# Patient Record
Sex: FEMALE | Race: BLACK OR AFRICAN AMERICAN | Employment: FULL TIME | URBAN - METROPOLITAN AREA
[De-identification: names, ages, dates, MRNs, and addresses within clinical notes are randomized per-mention and may not be internally consistent; named-entity substitution may affect disease eponyms.]

---

## 2018-12-11 LAB
ANTIBODY SCREEN, EXTERNAL: NEGATIVE
CHLAMYDIA, EXTERNAL: NEGATIVE
HBSAG, EXTERNAL: NEGATIVE
HIV, EXTERNAL: NON REACTIVE
N. GONORRHEA, EXTERNAL: NEGATIVE
RPR, EXTERNAL: NON REACTIVE
RUBELLA, EXTERNAL: NORMAL
TYPE, ABO & RH, EXTERNAL: NORMAL

## 2019-06-03 ENCOUNTER — HOSPITAL ENCOUNTER (EMERGENCY)
Age: 29
Discharge: HOME | End: 2019-06-03
Attending: SPECIALIST | Admitting: SPECIALIST
Payer: COMMERCIAL

## 2019-06-03 PROCEDURE — 59025 FETAL NON-STRESS TEST: CPT

## 2019-06-03 PROCEDURE — 74011250636 HC RX REV CODE- 250/636: Performed by: ADVANCED PRACTICE MIDWIFE

## 2019-06-03 PROCEDURE — 99281 EMR DPT VST MAYX REQ PHY/QHP: CPT

## 2019-06-03 PROCEDURE — 96372 THER/PROPH/DIAG INJ SC/IM: CPT

## 2019-06-03 RX ORDER — BETAMETHASONE SODIUM PHOSPHATE AND BETAMETHASONE ACETATE 3; 3 MG/ML; MG/ML
12 INJECTION, SUSPENSION INTRA-ARTICULAR; INTRALESIONAL; INTRAMUSCULAR; SOFT TISSUE EVERY 24 HOURS
Status: DISCONTINUED | OUTPATIENT
Start: 2019-06-03 | End: 2019-06-03 | Stop reason: HOSPADM

## 2019-06-03 RX ADMIN — BETAMETHASONE SODIUM PHOSPHATE AND BETAMETHASONE ACETATE 12 MG: 3; 3 INJECTION, SUSPENSION INTRA-ARTICULAR; INTRALESIONAL; INTRAMUSCULAR at 16:34

## 2019-06-03 NOTE — PROGRESS NOTES
Received this 33 yo G4 from [place] [id number] office, per pt Dr. Kate Palomino wanted her to come over to L&D to get BMZ. Indiana Holloway on unit orders received.

## 2019-06-03 NOTE — DISCHARGE INSTRUCTIONS
Patient Education        Weeks 30 to 28 of Your Pregnancy: Care Instructions  Your Care Instructions    You have made it to the final months of your pregnancy. By now, your baby is really starting to look like a baby, with hair and plump skin. As you enter the final weeks of pregnancy, the reality of having a baby may start to set in. This is the time to settle on a name, get your household in order, set up a safe nursery, and find quality  if needed. Doing these things in advance will allow you to focus on caring for and enjoying your new baby. You may also want to have a tour of your hospital's labor and delivery unit to get a better idea of what to expect while you are in the hospital.  During these last months, it is very important to take good care of yourself and pay attention to what your body needs. If your doctor says it is okay for you to work, don't push yourself too hard. Use the tips provided in this care sheet to ease heartburn and care for varicose veins. If you haven't already had the Tdap shot during this pregnancy, talk to your doctor about getting it. It will help protect your  against pertussis infection. Follow-up care is a key part of your treatment and safety. Be sure to make and go to all appointments, and call your doctor if you are having problems. It's also a good idea to know your test results and keep a list of the medicines you take. How can you care for yourself at home? Pay attention to your baby's movements  · You should feel your baby move several times every day. · Your baby now turns less, and kicks and jabs more. · Your baby sleeps 20 to 45 minutes at a time and is more active at certain times of day. · If your doctor wants you to count your baby's kicks:  ? Empty your bladder, and lie on your side or relax in a comfortable chair. ? Write down your start time. ? Pay attention only to your baby's movements. Count any movement except hiccups. ?  After you have counted 10 movements, write down your stop time. ? Write down how many minutes it took for your baby to move 10 times. ? If an hour goes by and you have not recorded 10 movements, have something to eat or drink and then count for another hour. If you do not record 10 movements in either hour, call your doctor. Ease heartburn  · Eat small, frequent meals. · Do not eat chocolate, peppermint, or very spicy foods. Avoid drinks with caffeine, such as coffee, tea, and sodas. · Avoid bending over or lying down after meals. · Talk a short walk after you eat. · If heartburn is a problem at night, do not eat for 2 hours before bedtime. · Take antacids like Mylanta, Maalox, Rolaids, or Tums. Do not take antacids that have sodium bicarbonate. Care for varicose veins  · Varicose veins are blood vessels that stretch out with the extra blood during pregnancy. Your legs may ache or throb. Most varicose veins will go away after the birth. · Avoid standing for long periods of time. Sit with your legs crossed at the ankles, not the knees. · Sit with your feet propped up. · Avoid tight clothing or stockings. Wear support hose. · Exercise regularly. Try walking for at least 30 minutes a day. Where can you learn more? Go to http://aureliano-van.info/. Enter S109 in the search box to learn more about \"Weeks 30 to 32 of Your Pregnancy: Care Instructions. \"  Current as of: September 5, 2018  Content Version: 11.9  © 6934-8112 [employer]. Care instructions adapted under license by [employer] (which disclaims liability or warranty for this information). If you have questions about a medical condition or this instruction, always ask your healthcare professional. [place] [address] any warranty or liability for your use of this information.

## 2019-06-04 ENCOUNTER — HOSPITAL ENCOUNTER (OUTPATIENT)
Age: 29
Discharge: HOME | End: 2019-06-04
Attending: SPECIALIST | Admitting: SPECIALIST
Payer: COMMERCIAL

## 2019-06-04 VITALS — WEIGHT: 164 LBS | BODY MASS INDEX: 29.06 KG/M2 | HEIGHT: 63 IN

## 2019-06-04 PROBLEM — Z34.90 PREGNANCY: Status: ACTIVE | Noted: 2019-06-04

## 2019-06-04 PROCEDURE — 96372 THER/PROPH/DIAG INJ SC/IM: CPT

## 2019-06-04 PROCEDURE — 74011250636 HC RX REV CODE- 250/636

## 2019-06-04 RX ORDER — BETAMETHASONE SODIUM PHOSPHATE AND BETAMETHASONE ACETATE 3; 3 MG/ML; MG/ML
12 INJECTION, SUSPENSION INTRA-ARTICULAR; INTRALESIONAL; INTRAMUSCULAR; SOFT TISSUE ONCE
Status: COMPLETED | OUTPATIENT
Start: 2019-06-04 | End: 2019-06-04

## 2019-06-04 RX ORDER — BETAMETHASONE SODIUM PHOSPHATE AND BETAMETHASONE ACETATE 3; 3 MG/ML; MG/ML
INJECTION, SUSPENSION INTRA-ARTICULAR; INTRALESIONAL; INTRAMUSCULAR; SOFT TISSUE
Status: COMPLETED
Start: 2019-06-04 | End: 2019-06-04

## 2019-06-04 RX ADMIN — BETAMETHASONE SODIUM PHOSPHATE AND BETAMETHASONE ACETATE 12 MG: 3; 3 INJECTION, SUSPENSION INTRA-ARTICULAR; INTRALESIONAL; INTRAMUSCULAR; SOFT TISSUE at 16:35

## 2019-06-04 RX ADMIN — BETAMETHASONE SODIUM PHOSPHATE AND BETAMETHASONE ACETATE 12 MG: 3; 3 INJECTION, SUSPENSION INTRA-ARTICULAR; INTRALESIONAL; INTRAMUSCULAR at 16:35

## 2019-06-04 NOTE — DISCHARGE INSTRUCTIONS
General Discharge Instructions    Patient ID:  Virgil Booker  [id number]  34 y.o.  1990    Patient Instructions    Take Home Medications       What to do at Home    Recommended diet: Regular Diet    Recommended activity: Activity as tolerated    Follow-up Make appt with Dr. Mary Walls for Monday.

## 2019-06-04 NOTE — PROGRESS NOTES
1630: Spoke with Dr. Luna Nesbitt about POC with pt, Dr. Luna Nesbitt said that pt was there for only betamethasone shot then pt could go home. I clarified that pt was only here for 2nd betamethasone shot and pt could be discharged and Dr. Luna Nesbitt said yes. 1643: pt given d/c instructions, pt d/c'd home.

## 2019-07-18 ENCOUNTER — HOSPITAL ENCOUNTER (INPATIENT)
Age: 29
LOS: 2 days | Discharge: HOME | End: 2019-07-20
Attending: SPECIALIST | Admitting: SPECIALIST
Payer: COMMERCIAL

## 2019-07-18 LAB
BASOPHILS # BLD: 0 K/UL (ref 0–0.1)
BASOPHILS NFR BLD: 1 % (ref 0–1)
DIFFERENTIAL METHOD BLD: ABNORMAL
EOSINOPHIL # BLD: 0.2 K/UL (ref 0–0.4)
EOSINOPHIL NFR BLD: 3 % (ref 0–7)
ERYTHROCYTE [DISTWIDTH] IN BLOOD BY AUTOMATED COUNT: 14.6 % (ref 11.5–14.5)
HCT VFR BLD AUTO: 32.8 % (ref 35–47)
HGB BLD-MCNC: 10.9 G/DL (ref 11.5–16)
IMM GRANULOCYTES # BLD AUTO: 0.1 K/UL (ref 0–0.04)
IMM GRANULOCYTES NFR BLD AUTO: 1 % (ref 0–0.5)
LYMPHOCYTES # BLD: 1.2 K/UL (ref 0.8–3.5)
LYMPHOCYTES NFR BLD: 15 % (ref 12–49)
MCH RBC QN AUTO: 28.2 PG (ref 26–34)
MCHC RBC AUTO-ENTMCNC: 33.2 G/DL (ref 30–36.5)
MCV RBC AUTO: 84.8 FL (ref 80–99)
MONOCYTES # BLD: 0.6 K/UL (ref 0–1)
MONOCYTES NFR BLD: 8 % (ref 5–13)
NEUTS SEG # BLD: 5.7 K/UL (ref 1.8–8)
NEUTS SEG NFR BLD: 72 % (ref 32–75)
NRBC # BLD: 0 K/UL (ref 0–0.01)
NRBC BLD-RTO: 0 PER 100 WBC
PLATELET # BLD AUTO: 175 K/UL (ref 150–400)
PMV BLD AUTO: 10.8 FL (ref 8.9–12.9)
RBC # BLD AUTO: 3.87 M/UL (ref 3.8–5.2)
WBC # BLD AUTO: 7.7 K/UL (ref 3.6–11)

## 2019-07-18 PROCEDURE — 74011000258 HC RX REV CODE- 258: Performed by: SPECIALIST

## 2019-07-18 PROCEDURE — 99283 EMERGENCY DEPT VISIT LOW MDM: CPT

## 2019-07-18 PROCEDURE — 74011250637 HC RX REV CODE- 250/637: Performed by: SPECIALIST

## 2019-07-18 PROCEDURE — 75410000003 HC RECOV DEL/VAG/CSECN EA 0.5 HR

## 2019-07-18 PROCEDURE — 36415 COLL VENOUS BLD VENIPUNCTURE: CPT

## 2019-07-18 PROCEDURE — 74011250636 HC RX REV CODE- 250/636

## 2019-07-18 PROCEDURE — 85025 COMPLETE CBC W/AUTO DIFF WBC: CPT

## 2019-07-18 PROCEDURE — 65410000002 HC RM PRIVATE OB

## 2019-07-18 PROCEDURE — 75410000002 HC LABOR FEE PER 1 HR

## 2019-07-18 PROCEDURE — 74011250636 HC RX REV CODE- 250/636: Performed by: SPECIALIST

## 2019-07-18 PROCEDURE — 75410000000 HC DELIVERY VAGINAL/SINGLE

## 2019-07-18 PROCEDURE — 88307 TISSUE EXAM BY PATHOLOGIST: CPT

## 2019-07-18 PROCEDURE — 75810000275 HC EMERGENCY DEPT VISIT NO LEVEL OF CARE

## 2019-07-18 RX ORDER — ZOLPIDEM TARTRATE 5 MG/1
5 TABLET ORAL
Status: DISCONTINUED | OUTPATIENT
Start: 2019-07-18 | End: 2019-07-18 | Stop reason: SDUPTHER

## 2019-07-18 RX ORDER — HYDROCORTISONE ACETATE PRAMOXINE HCL 2.5; 1 G/100G; G/100G
CREAM TOPICAL AS NEEDED
Status: DISCONTINUED | OUTPATIENT
Start: 2019-07-18 | End: 2019-07-20 | Stop reason: HOSPADM

## 2019-07-18 RX ORDER — SODIUM CHLORIDE, SODIUM LACTATE, POTASSIUM CHLORIDE, CALCIUM CHLORIDE 600; 310; 30; 20 MG/100ML; MG/100ML; MG/100ML; MG/100ML
125 INJECTION, SOLUTION INTRAVENOUS CONTINUOUS
Status: DISCONTINUED | OUTPATIENT
Start: 2019-07-18 | End: 2019-07-18

## 2019-07-18 RX ORDER — NALOXONE HYDROCHLORIDE 0.4 MG/ML
0.4 INJECTION, SOLUTION INTRAMUSCULAR; INTRAVENOUS; SUBCUTANEOUS AS NEEDED
Status: DISCONTINUED | OUTPATIENT
Start: 2019-07-18 | End: 2019-07-18

## 2019-07-18 RX ORDER — NALOXONE HYDROCHLORIDE 0.4 MG/ML
0.4 INJECTION, SOLUTION INTRAMUSCULAR; INTRAVENOUS; SUBCUTANEOUS AS NEEDED
Status: DISCONTINUED | OUTPATIENT
Start: 2019-07-18 | End: 2019-07-20 | Stop reason: HOSPADM

## 2019-07-18 RX ORDER — IBUPROFEN 400 MG/1
800 TABLET ORAL EVERY 8 HOURS
Status: DISCONTINUED | OUTPATIENT
Start: 2019-07-18 | End: 2019-07-20 | Stop reason: HOSPADM

## 2019-07-18 RX ORDER — OXYTOCIN/RINGER'S LACTATE 20/1000 ML
999 PLASTIC BAG, INJECTION (ML) INTRAVENOUS ONCE
Status: ACTIVE | OUTPATIENT
Start: 2019-07-18 | End: 2019-07-19

## 2019-07-18 RX ORDER — ZOLPIDEM TARTRATE 5 MG/1
5 TABLET ORAL
Status: DISCONTINUED | OUTPATIENT
Start: 2019-07-18 | End: 2019-07-20 | Stop reason: HOSPADM

## 2019-07-18 RX ORDER — OXYCODONE AND ACETAMINOPHEN 5; 325 MG/1; MG/1
1 TABLET ORAL
Status: DISCONTINUED | OUTPATIENT
Start: 2019-07-18 | End: 2019-07-20 | Stop reason: HOSPADM

## 2019-07-18 RX ORDER — OXYTOCIN/RINGER'S LACTATE 20/1000 ML
PLASTIC BAG, INJECTION (ML) INTRAVENOUS
Status: COMPLETED
Start: 2019-07-18 | End: 2019-07-18

## 2019-07-18 RX ORDER — ACETAMINOPHEN 325 MG/1
650 TABLET ORAL
Status: DISCONTINUED | OUTPATIENT
Start: 2019-07-18 | End: 2019-07-20 | Stop reason: HOSPADM

## 2019-07-18 RX ORDER — SODIUM CHLORIDE 0.9 % (FLUSH) 0.9 %
5-40 SYRINGE (ML) INJECTION AS NEEDED
Status: DISCONTINUED | OUTPATIENT
Start: 2019-07-18 | End: 2019-07-18

## 2019-07-18 RX ORDER — SODIUM CHLORIDE 0.9 % (FLUSH) 0.9 %
5-40 SYRINGE (ML) INJECTION EVERY 8 HOURS
Status: DISCONTINUED | OUTPATIENT
Start: 2019-07-18 | End: 2019-07-18

## 2019-07-18 RX ORDER — HYDROCORTISONE ACETATE PRAMOXINE HCL 2.5; 1 G/100G; G/100G
CREAM TOPICAL AS NEEDED
Status: DISCONTINUED | OUTPATIENT
Start: 2019-07-18 | End: 2019-07-18

## 2019-07-18 RX ORDER — PENICILLIN G POTASSIUM 5000000 [IU]/1
INJECTION, POWDER, FOR SOLUTION INTRAMUSCULAR; INTRAVENOUS
Status: DISCONTINUED
Start: 2019-07-18 | End: 2019-07-18

## 2019-07-18 RX ADMIN — SODIUM CHLORIDE, SODIUM LACTATE, POTASSIUM CHLORIDE, AND CALCIUM CHLORIDE 125 ML/HR: 600; 310; 30; 20 INJECTION, SOLUTION INTRAVENOUS at 10:59

## 2019-07-18 RX ADMIN — ACETAMINOPHEN 650 MG: 325 TABLET ORAL at 20:53

## 2019-07-18 RX ADMIN — SODIUM CHLORIDE 5 MILLION UNITS: 900 INJECTION, SOLUTION INTRAVENOUS at 11:00

## 2019-07-18 RX ADMIN — IBUPROFEN 800 MG: 400 TABLET ORAL at 22:05

## 2019-07-18 RX ADMIN — IBUPROFEN 800 MG: 400 TABLET ORAL at 13:47

## 2019-07-18 RX ADMIN — Medication: at 12:55

## 2019-07-18 NOTE — ED TRIAGE NOTES
Dr Alejandra Carrera in triage to evaluate     Pt taken to L&D by [facility] Manohar Best This RN spoke with Toan in L&D

## 2019-07-18 NOTE — ROUTINE PROCESS
TRANSFER - OUT REPORT:    Verbal report given to MAYTE Lima RN(name) on Melecio Kumar  being transferred to MIU(unit) for routine progression of care       Report consisted of patients Situation, Background, Assessment and   Recommendations(SBAR). Information from the following report(s) SBAR, Kardex, Procedure Summary, Intake/Output, MAR, Accordion, Recent Results and Med Rec Status was reviewed with the receiving nurse. Lines:   Peripheral IV 07/18/19 Anterior; Left Wrist (Active)   Site Assessment Clean, dry, & intact 7/18/2019 11:10 AM   Phlebitis Assessment 0 7/18/2019 11:10 AM   Infiltration Assessment 0 7/18/2019 11:10 AM   Dressing Status Clean, dry, & intact 7/18/2019 11:10 AM   Dressing Type Tape;Transparent 7/18/2019 11:10 AM   Hub Color/Line Status Pink; Infusing;Patent 7/18/2019 11:10 AM   Action Taken Blood drawn 7/18/2019 11:10 AM        Opportunity for questions and clarification was provided.       Patient transported with:   Registered Nurse

## 2019-07-18 NOTE — LACTATION NOTE
This note was copied from a baby's chart. Mother expresses desire to exclusively pump. Has 2 other children at home who she exclusively pumped for, both NICU grads. She lost a 23 week infant in 2015 at [address]. Brought in Piperhony pump and gave instructions on setup and use. Mother to pump every 3 hours. Has a [employer] pis at home for use. Lactation number left on white board, encouraged her to call with any questions or concerns.
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
## 2019-07-18 NOTE — PROGRESS NOTES
Report from [address] [place] [address], care assumed. Pt does not wish to get up at this time to void. 1600 pt up to bathroom, passed 2 baseball sized clots and 1 quarter sized clot, fundus firm -1 pt voided large amount.     1700 no clots uterus firm -1.  1915 report to Zev Aguilar

## 2019-07-18 NOTE — PROGRESS NOTES
1650-W0M6 here from home complaining of contractions since 0845-    1038-SVE by  RN, cervix 5. Will admit for labor. 1044-Patient off monitor,  BPM. to room 3172.    1236-SVE by Dr. Claire Street cervix 10/100/0. AROM done at this time with a moderate amount of blood-tinged fluid. pericare done and pads changed. 1244-Patient wasnts to push, Dr. Claire Street in the room, bed broken down. 1248-Patient pushing. 1251- of live infant female, see deliver summary. 1255-Placenta delivered.

## 2019-07-19 PROCEDURE — 74011250637 HC RX REV CODE- 250/637: Performed by: SPECIALIST

## 2019-07-19 PROCEDURE — 65410000002 HC RM PRIVATE OB

## 2019-07-19 RX ADMIN — ACETAMINOPHEN 650 MG: 325 TABLET ORAL at 01:40

## 2019-07-19 RX ADMIN — IBUPROFEN 800 MG: 400 TABLET ORAL at 06:14

## 2019-07-19 NOTE — PROGRESS NOTES
Post-Partum Day Number 1 Progress Note    Patient doing well post-partum without significant complaint. Voiding withour difficulty, normal lochia. Vitals:    Patient Vitals for the past 8 hrs:   BP Temp Pulse Resp   19 1140 99/68 97.6 °F (36.4 °C) 94 16   19 0825 112/72 97.7 °F (36.5 °C) 72 16     Temp (24hrs), Av.9 °F (36.6 °C), Min:97.6 °F (36.4 °C), Max:98.3 °F (36.8 °C)      Vital signs stable, afebrile. Exam:  Patient without distress. Abdomen soft, fundus firm at level of umbilicus, nontender               Perineum with normal lochia noted. Lower extremities are negative for swelling, cords or tenderness. Lab/Data Review: All lab results for the last 24 hours reviewed. Assessment and Plan:  Patient appears to be having uncomplicated post-partum course. Continue routine perineal care and maternal education. Plan discharge tomorrow if no problems occur.

## 2019-07-19 NOTE — ROUTINE PROCESS
Bedside and Verbal shift change report given to PROSPER Sorensen (oncoming nurse) by Melissa Sadler RN (offgoing nurse). Report included the following information SBAR, Kardex, Intake/Output, MAR and Recent Results.

## 2019-07-19 NOTE — OP NOTES
[address]  OPERATIVE REPORT    Name:  Zia Hutchison  MR#:  [id number]  :  1990  ACCOUNT #:  [de-identified]  DATE OF SERVICE:  2019      DELIVERY NOTE    PRE-DELIVERY DIAGNOSES:  Thirty five plus weeks gestational age, ruptured membranes, vaginal bleeding. POSTOPERATIVE DIAGNOSIS:       PROCEDURE PERFORMED:         SURGEON:  Aleksandr Almaraz MD    ASSISTANT:       ANESTHESIA:       COMPLICATIONS:        SPECIMENS REMOVED:        IMPLANTS:       ESTIMATED BLOOD LOSS:       FINDINGS:  Moderate blood in the vaginal vault. The patient was fully dilated. The patient had a spontaneous vaginal delivery of a female infant. Upon delivery of the infant, a large clot was delivered. The cord was clamped x2, cut. Cord gases and cord blood was obtained. The placenta delivered spontaneously and was examined and noted to have a questionable partial abruption. The uterus contracted firmly with pitch. She tolerated the procedure well. Needle, sponge and instrument counts were all correct x2 at the end of the procedure. No vaginal tears.         MD NENA Patel/[email or web address]  D:  2019 13:14  T:  2019 16:20  JOB #:  [id number]

## 2019-07-19 NOTE — ROUTINE PROCESS
Bedside and Verbal shift change report given to B. Reynold Rinne RN (oncoming nurse) by Stephanie Barbosa RN (offgoing nurse). Report included the following information SBAR.

## 2019-07-19 NOTE — LACTATION NOTE
This note was copied from a baby's chart. Mother remains exclusively pumping as her feeding plan, giving formula q feeding. Manual massage, compression and expression of milk instructed intermittently during pumping sessions. Benefits of increasing milk production with this low tech but highly effective stimulation process reviewed. Voices understanding and agrees/experienced with previous NICU grads x 2.  4.8 ml ebm to date. 70 ml formula tolerated well. Lanolin provided. Has [employer] PIS for at home use at discharge. LC# provided. Call prn.

## 2019-07-20 VITALS
TEMPERATURE: 98 F | WEIGHT: 171 LBS | HEIGHT: 63 IN | DIASTOLIC BLOOD PRESSURE: 69 MMHG | SYSTOLIC BLOOD PRESSURE: 107 MMHG | RESPIRATION RATE: 16 BRPM | BODY MASS INDEX: 30.3 KG/M2 | HEART RATE: 79 BPM | OXYGEN SATURATION: 99 %

## 2019-07-20 PROBLEM — Z34.90 PREGNANCY: Status: RESOLVED | Noted: 2019-06-04 | Resolved: 2019-07-20

## 2019-07-20 NOTE — DISCHARGE SUMMARY
Post-Partum Day Number 2 Progress/Discharge Note    Patient doing well post-partum without significant complaint. Voiding without difficulty, normal lochia, positive flatus. Vitals:    Patient Vitals for the past 8 hrs:   BP Temp Pulse Resp   19 0800 107/69 98 °F (36.7 °C) 79 16   19 0515 100/77 97.8 °F (36.6 °C) 75 17     Temp (24hrs), Av °F (36.7 °C), Min:97.8 °F (36.6 °C), Max:98.1 °F (36.7 °C)      Vital signs stable, afebrile. Exam:  Patient without distress. Abdomen soft, fundus firm at level of umbilicus, non tender               Perineum with normal lochia noted. Lower extremities are negative for swelling, cords or tenderness. Lab/Data Review: All lab results for the last 24 hours reviewed. Assessment and Plan:  Patient appears to be having uncomplicated post-partum course. Continue routine perineal care and maternal education. Plan discharge for today with follow up in our office in 4 weeks.

## 2019-07-20 NOTE — LACTATION NOTE
This note was copied from a baby's chart. Continues pumping only, volumes increasing this am. 46.5 ml ebm/139 formula. Wt assessed at -4.7%   Has Dr. Bri Martinez bottle system for home use. Using formula in the interim as lactogenesis begins. Anticipating discharge. Warmline and support group information provided. Gift bag given. Expect success. Call prn.

## 2019-07-20 NOTE — ROUTINE PROCESS
Bedside and Verbal shift change report given to PROSPER Figueroa,RN,RN (oncoming nurse) by SEBASTIÁN Arce RNC-MNN (offgoing nurse).  Report included the following information SBAR, Procedure Summary, Intake/Output, MAR and Recent Results.

## 2019-07-20 NOTE — PROGRESS NOTES
Discharge instructions given to pt with understanding voiced. Discharged to room, awaiting infant's discharge.

## 2019-07-20 NOTE — DISCHARGE INSTRUCTIONS

## 2022-06-06 LAB
ANTIBODY SCREEN, EXTERNAL: NEGATIVE
HBSAG, EXTERNAL: NEGATIVE
HEPATITIS C AB,   EXT: NEGATIVE
HIV, EXTERNAL: NON REACTIVE
RPR, EXTERNAL: NON REACTIVE
RUBELLA, EXTERNAL: NORMAL
TYPE, ABO & RH, EXTERNAL: NORMAL

## 2022-08-03 ENCOUNTER — HOSPITAL ENCOUNTER (EMERGENCY)
Age: 32
Discharge: HOME | End: 2022-08-04
Attending: EMERGENCY MEDICINE
Payer: COMMERCIAL

## 2022-08-03 VITALS
BODY MASS INDEX: 29.57 KG/M2 | HEIGHT: 63 IN | WEIGHT: 166.89 LBS | TEMPERATURE: 98.1 F | RESPIRATION RATE: 16 BRPM | HEART RATE: 82 BPM | SYSTOLIC BLOOD PRESSURE: 117 MMHG | OXYGEN SATURATION: 97 % | DIASTOLIC BLOOD PRESSURE: 77 MMHG

## 2022-08-03 DIAGNOSIS — G89.29 CHRONIC LEFT-SIDED LOW BACK PAIN WITH LEFT-SIDED SCIATICA: ICD-10-CM

## 2022-08-03 DIAGNOSIS — O23.42 UTI IN PREGNANCY, ANTEPARTUM, SECOND TRIMESTER: Primary | ICD-10-CM

## 2022-08-03 DIAGNOSIS — M54.42 CHRONIC LEFT-SIDED LOW BACK PAIN WITH LEFT-SIDED SCIATICA: ICD-10-CM

## 2022-08-03 LAB
ALBUMIN SERPL-MCNC: 3.1 G/DL (ref 3.5–5)
ALBUMIN/GLOB SERPL: 0.8 {RATIO} (ref 1.1–2.2)
ALP SERPL-CCNC: 62 U/L (ref 45–117)
ALT SERPL-CCNC: 14 U/L (ref 12–78)
ANION GAP SERPL CALC-SCNC: 8 MMOL/L (ref 5–15)
APPEARANCE UR: ABNORMAL
AST SERPL-CCNC: 18 U/L (ref 15–37)
BACTERIA URNS QL MICRO: ABNORMAL /HPF
BILIRUB SERPL-MCNC: 0.3 MG/DL (ref 0.2–1)
BILIRUB UR QL: NEGATIVE
BUN SERPL-MCNC: 5 MG/DL (ref 6–20)
BUN/CREAT SERPL: 9 (ref 12–20)
CALCIUM SERPL-MCNC: 9 MG/DL (ref 8.5–10.1)
CHLORIDE SERPL-SCNC: 108 MMOL/L (ref 97–108)
CO2 SERPL-SCNC: 24 MMOL/L (ref 21–32)
COLOR UR: ABNORMAL
CREAT SERPL-MCNC: 0.58 MG/DL (ref 0.55–1.02)
EPITH CASTS URNS QL MICRO: ABNORMAL /LPF
ERYTHROCYTE [DISTWIDTH] IN BLOOD BY AUTOMATED COUNT: 13.2 % (ref 11.5–14.5)
GLOBULIN SER CALC-MCNC: 4.1 G/DL (ref 2–4)
GLUCOSE SERPL-MCNC: 91 MG/DL (ref 65–100)
GLUCOSE UR STRIP.AUTO-MCNC: NEGATIVE MG/DL
HCT VFR BLD AUTO: 32.8 % (ref 35–47)
HGB BLD-MCNC: 11.3 G/DL (ref 11.5–16)
HGB UR QL STRIP: NEGATIVE
KETONES UR QL STRIP.AUTO: NEGATIVE MG/DL
LEUKOCYTE ESTERASE UR QL STRIP.AUTO: ABNORMAL
LIPASE SERPL-CCNC: 106 U/L (ref 73–393)
MCH RBC QN AUTO: 30.2 PG (ref 26–34)
MCHC RBC AUTO-ENTMCNC: 34.5 G/DL (ref 30–36.5)
MCV RBC AUTO: 87.7 FL (ref 80–99)
MUCOUS THREADS URNS QL MICRO: ABNORMAL /LPF
NITRITE UR QL STRIP.AUTO: NEGATIVE
NRBC # BLD: 0 K/UL (ref 0–0.01)
NRBC BLD-RTO: 0 PER 100 WBC
PH UR STRIP: 6 [PH] (ref 5–8)
PLATELET # BLD AUTO: 203 K/UL (ref 150–400)
PMV BLD AUTO: 9.5 FL (ref 8.9–12.9)
POTASSIUM SERPL-SCNC: 3.3 MMOL/L (ref 3.5–5.1)
PROT SERPL-MCNC: 7.2 G/DL (ref 6.4–8.2)
PROT UR STRIP-MCNC: NEGATIVE MG/DL
RBC # BLD AUTO: 3.74 M/UL (ref 3.8–5.2)
RBC #/AREA URNS HPF: ABNORMAL /HPF (ref 0–5)
SODIUM SERPL-SCNC: 140 MMOL/L (ref 136–145)
SP GR UR REFRACTOMETRY: 1.01
UA: UC IF INDICATED,UAUC: ABNORMAL
UROBILINOGEN UR QL STRIP.AUTO: 1 EU/DL (ref 0.2–1)
WBC # BLD AUTO: 7.4 K/UL (ref 3.6–11)
WBC URNS QL MICRO: ABNORMAL /HPF (ref 0–4)

## 2022-08-03 PROCEDURE — 83690 ASSAY OF LIPASE: CPT

## 2022-08-03 PROCEDURE — 96365 THER/PROPH/DIAG IV INF INIT: CPT

## 2022-08-03 PROCEDURE — 85027 COMPLETE CBC AUTOMATED: CPT

## 2022-08-03 PROCEDURE — 99284 EMERGENCY DEPT VISIT MOD MDM: CPT

## 2022-08-03 PROCEDURE — 36415 COLL VENOUS BLD VENIPUNCTURE: CPT

## 2022-08-03 PROCEDURE — 80053 COMPREHEN METABOLIC PANEL: CPT

## 2022-08-03 PROCEDURE — 74011250636 HC RX REV CODE- 250/636: Performed by: EMERGENCY MEDICINE

## 2022-08-03 PROCEDURE — 74011000258 HC RX REV CODE- 258: Performed by: EMERGENCY MEDICINE

## 2022-08-03 PROCEDURE — 87086 URINE CULTURE/COLONY COUNT: CPT

## 2022-08-03 PROCEDURE — 81001 URINALYSIS AUTO W/SCOPE: CPT

## 2022-08-03 RX ORDER — CEPHALEXIN 500 MG/1
500 CAPSULE ORAL 4 TIMES DAILY
Qty: 28 CAPSULE | Refills: 0 | Status: SHIPPED | OUTPATIENT
Start: 2022-08-03 | End: 2022-08-10

## 2022-08-03 RX ADMIN — SODIUM CHLORIDE 1 G: 900 INJECTION INTRAVENOUS at 22:56

## 2022-08-03 NOTE — Clinical Note
[address]  [facility] EMERGENCY DEPT  [address] [place] [address]  [place] [id number] [phone]    Work/School Note    Date: 8/3/2022    To Whom It May concern:      Jamee Hood was seen and treated today in the emergency room by the following provider(s):  Attending Provider: Todd Mcwilliams MD.      Jamee Hood is excused from work/school on 08/03/22. She is clear to return to work/school on 08/04/22.         Sincerely,          Robin [place] MD
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
## 2022-08-04 NOTE — ED NOTES
I have reviewed verbal and written discharge instructions with the patient. The patient verbalized understanding. Iv removed. Pt alert and ambulatory upn d/c.

## 2022-08-04 NOTE — DISCHARGE INSTRUCTIONS
It was a pleasure taking care of you in our Emergency Department today. We know that when you come to [facility], you are entrusting us with your health, comfort, and safety. Our physicians and nurses honor that trust, and truly appreciate the opportunity to care for you and your loved ones. We also value your feedback. If you receive a survey about your Emergency Department experience today, please fill it out. We care about our patients' feedback, and we listen to what you have to say. Thank you!       Dr. Tamar Michaud MD.

## 2022-08-05 LAB
BACTERIA SPEC CULT: NORMAL
CC UR VC: NORMAL
SERVICE CMNT-IMP: NORMAL

## 2022-09-07 LAB
CHLAMYDIA, EXTERNAL: NEGATIVE
N. GONORRHEA, EXTERNAL: NEGATIVE

## 2022-10-03 ENCOUNTER — HOSPITAL ENCOUNTER (EMERGENCY)
Age: 32
Discharge: HOME | End: 2022-10-03
Attending: EMERGENCY MEDICINE | Admitting: SPECIALIST
Payer: COMMERCIAL

## 2022-10-03 VITALS
DIASTOLIC BLOOD PRESSURE: 72 MMHG | SYSTOLIC BLOOD PRESSURE: 103 MMHG | RESPIRATION RATE: 16 BRPM | BODY MASS INDEX: 30.86 KG/M2 | TEMPERATURE: 98.3 F | OXYGEN SATURATION: 99 % | WEIGHT: 174.16 LBS | HEART RATE: 83 BPM | HEIGHT: 63 IN

## 2022-10-03 LAB — FIBRONECTIN FETAL VAG QL: NEGATIVE

## 2022-10-03 PROCEDURE — 75810000275 HC EMERGENCY DEPT VISIT NO LEVEL OF CARE

## 2022-10-03 PROCEDURE — 99282 EMERGENCY DEPT VISIT SF MDM: CPT

## 2022-10-03 PROCEDURE — 82731 ASSAY OF FETAL FIBRONECTIN: CPT

## 2022-10-03 PROCEDURE — 59025 FETAL NON-STRESS TEST: CPT

## 2022-10-04 NOTE — PROGRESS NOTES
Jayashree Andrade is a 29 y.o.   at 26w6d patient of Dr Francisco J Garza at [facility] who presents to L&D triage with c/o contractions, last contraction was at 1815 10/3. She reports Positive FM, denies vaginal bleeding and LOF. She also denies Headaches, Scotoma, RUQ pain, and Edema. Urine sample obtained. EFM and toco placed for initial assessment.     . Dr. Kvng Kim in room, strip reviewed, FFN obtained and sent, plan to discharge patient with follow up with OBGYN once result is back.     . Discussed FFN results with lab, negative result, will discharge patient. . I have reviewed discharge instructions with the patient. The patient verbalized understanding will follow up with Dr. Francisco J Garza in the office. . Pt ambulated off the unit at this time.
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
## 2022-10-04 NOTE — DISCHARGE INSTRUCTIONS
Belly Pain in Pregnancy: Care Instructions  Overview     When you're pregnant, any belly pain can be a worry. You may not want to call your doctor or midwife about every pain you have. But you don't want to miss something that is dangerous for you or your baby. Even if it feels familiar, belly pain can mean something new when you're pregnant. It's important to know when to call your doctor or midwife. It will also help to know how to care for yourself at home when your pain is not caused by anything harmful. When belly pain is more severe or constant, see a doctor or midwife right away. If you're sure your belly pain is a sign of labor, call your doctor or midwife. When belly pain is brief, it's usually a normal part of pregnancy. It might be related to changes in the growing uterus. Or it could be the stretching of ligaments called round ligaments. These ligaments help support the uterus. Round ligament pain can be on either side of your belly. It can also be felt in your hips or groin. Follow-up care is a key part of your treatment and safety. Be sure to make and go to all appointments, and call your doctor if you are having problems. It's also a good idea to know your test results and keep a list of the medicines you take. How can you tell if belly pain is a sign of labor? When belly pain is caused by labor, it can feel like mild or menstrual-like cramps in your lower belly. These cramps are probably contractions. They can happen in your second or third trimester. You may also have:  A steady, dull ache in your lower back, pelvis, or thighs. A feeling of pressure in your pelvis or lower belly. Changes in your vaginal discharge or a sudden release of fluid from the vagina. If you think you are in labor, call your doctor. How can you care for yourself at home? When belly pain is mild and is not a symptom of labor:  Rest until you feel better. Take a warm bath.   Think about what you drink and eat:  Drink plenty of fluids. Choose water and other clear liquids until you feel better. Try eating small, frequent meals. If your stomach is upset, try bland, low-fat foods like plain rice, broiled chicken, toast, and yogurt. Think about how you move if you are having brief pains from stretching of the round ligaments. Try gentle stretching. Move a little more slowly when turning in bed or getting up from a chair, so those ligaments don't stretch quickly. Lean forward a bit if you think you are going to cough or sneeze. When should you call for help? Call 911  anytime you think you may need emergency care. For example, call if:    You have sudden, severe pain in your belly. You have severe vaginal bleeding. You passed out (lost consciousness). You have a seizure. Call your doctor now or seek immediate medical care if:    You have new or worse belly pain or cramping. You have any vaginal bleeding. You have a fever. You have symptoms of preeclampsia, such as:  Sudden swelling of your face, hands, or feet. New vision problems (such as dimness, blurring, or seeing spots). A severe headache. You think that you may be in labor. This means that you've had at least 8 contractions within 1 hour or at least 4 contractions within 20 minutes, even after you change your position and drink fluids. You have symptoms of a urinary tract infection. These may include:  Pain or burning when you urinate. A frequent need to urinate without being able to pass much urine. Pain in the flank, which is just below the rib cage and above the waist on either side of the back. Blood in your urine. Watch closely for changes in your health, and be sure to contact your doctor if you are worried about your or your baby's health. Where can you learn more?   Go to http://www.gray.com/  Enter B275 in the search box to learn more about \"Belly Pain in Pregnancy: Care Instructions. \"  Current as of: 2021               Content Version: 13.2  © 6160-8317 [employer]. Care instructions adapted under license by [employer] (which disclaims liability or warranty for this information). If you have questions about a medical condition or this instruction, always ask your healthcare professional. [address] any warranty or liability for your use of this information. Weeks 26 to 30 of Your Pregnancy: Care Instructions  Overview     You are now entering your last trimester of pregnancy. Your baby is growing quickly. Chilango Adame probably feel your baby moving around more often. Your doctor may ask you to count your baby's kicks. Your back may ache as your body gets used to your baby's size and length. If you haven't already had the Tdap shot during this pregnancy, talk to your doctor about getting it. It will help protect your  against pertussis infection. During this time, it's important to take care of yourself and pay attention to what your body needs. If you feel sexual, you can explore ways to be close with your partner that match your comfort and desire. Follow-up care is a key part of your treatment and safety. Be sure to make and go to all appointments, and call your doctor if you are having problems. It's also a good idea to know your test results and keep a list of the medicines you take. How can you care for yourself at home? Take it easy at work  Take frequent breaks. If possible, stop working when you are tired, and rest during your lunch hour. Take bathroom breaks every 2 hours. Change positions often. If you sit for long periods, stand up and walk around. When you stand for a long time, keep one foot on a low stool with your knee bent. After standing a lot, sit with your feet up. Avoid fumes, chemicals, and tobacco smoke.   Be sexual in your own way  Having sex during pregnancy is okay, unless your doctor tells you not to. You may be very interested in sex, or you may have no interest at all. Your growing belly can make it hard to find a good position during intercourse. [place] and explore. You may get cramps in your uterus when your partner touches your breasts. A back rub may relieve the backache or cramps that sometimes follow orgasm. Learn about  labor  Watch for signs of  labor. You may be going into labor if:  You have menstrual-like cramps, with or without nausea. You have about 6 or more contractions in 1 hour, even after you have had a glass of water and are resting. You have a low, dull backache that does not go away when you change your position. You have pain or pressure in your pelvis that comes and goes in a pattern. You have intestinal cramping or flu-like symptoms, with or without diarrhea. You notice an increase or change in your vaginal discharge. Discharge may be heavy, mucus-like, watery, or streaked with blood. Your water breaks. If you think you have  labor:  Drink 2 or 3 glasses of water or juice. Not drinking enough fluids can cause contractions. Stop what you are doing, and empty your bladder. Then lie down on your left side for at least 1 hour. While lying on your side, find your breast bone. Put your fingers in the soft spot just below it. Move your fingers down toward your belly button to find the top of your uterus. Check to see if it is tight. Contractions can be weak or strong. Record your contractions for an hour. Time a contraction from the start of one contraction to the start of the next one. Single or several strong contractions without a pattern are called Meagher-Jean contractions. They are practice contractions but not the start of labor. They often stop if you change what you are doing. Call your doctor if you have regular contractions. Where can you learn more?   Go to http://www.gray.com/  Enter O242 in the search box to learn more about \"Weeks 26 to 30 of Your Pregnancy: Care Instructions. \"  Current as of: 2021               Content Version: 13.2   [employer]. Care instructions adapted under license by [employer] (which disclaims liability or warranty for this information). If you have questions about a medical condition or this instruction, always ask your healthcare professional. [place] [address] any warranty or liability for your use of this information.  Labor: Care Instructions  Overview      labor is the start of labor between 21 and 36 weeks of pregnancy. Most babies are born at 40 to 41 weeks of pregnancy. In labor, the uterus contracts to open the cervix. This is the first stage of childbirth.  labor can be caused by a problem with the baby, the mother, or both. Often the cause is not known. In some cases, doctors use medicines to try to delay labor until 29 or more weeks of pregnancy. By this time, a baby has grown enough so that problems are not likely. In some cases--such as with a serious infection--it is healthier for the baby to be born early. Your treatment will depend on how far along you are in your pregnancy and on your health and your baby's health. Follow-up care is a key part of your treatment and safety. Be sure to make and go to all appointments, and call your doctor if you are having problems. It's also a good idea to know your test results and keep a list of the medicines you take. How can you care for yourself at home? If your doctor prescribed medicines, take them exactly as directed. Call your doctor if you think you are having a problem with your medicine. Rest until your doctor advises you about activity. Do not have sexual intercourse unless your doctor says it is safe. Use sanitary pads if you have vaginal bleeding. Using pads makes it easier to monitor your bleeding.   Do not smoke or allow others to smoke around you. If you need help quitting, talk to your doctor about stop-smoking programs and medicines. These can increase your chances of quitting for good. When should you call for help? Call 911  anytime you think you may need emergency care. For example, call if:    You passed out (lost consciousness). You have a seizure. You have severe vaginal bleeding. You have severe pain in your belly or pelvis that doesn't get better between contractions. You have had fluid gushing or leaking from your vagina and you know or think the umbilical cord is bulging into your vagina. If this happens, immediately get down on your knees so your rear end (buttocks) is higher than your head. This will decrease the pressure on the cord until help arrives. Call your doctor now or seek immediate medical care if:    You have signs of preeclampsia, such as:  Sudden swelling of your face, hands, or feet. New vision problems (such as dimness, blurring, or seeing spots). A severe headache. You have any vaginal bleeding. You have belly pain or cramping. You have a fever. You have had regular contractions (with or without pain) for an hour. This means that you have 6 or more within 1 hour after you change your position and drink fluids. You have a sudden release of fluid from the vagina. You have low back pain or pelvic pressure that does not go away. You notice that your baby has stopped moving or is moving much less than normal.   Watch closely for changes in your health, and be sure to contact your doctor if you have any problems. Where can you learn more? Go to http://www.gray.com/  Enter Q400 in the search box to learn more about \" Labor: Care Instructions. \"  Current as of: 2021               Content Version: 13.2  © 3978-6298 [employer].    Care instructions adapted under license by [employer] (which disclaims liability or warranty for this information). If you have questions about a medical condition or this instruction, always ask your healthcare professional. [address] any warranty or liability for your use of this information.

## 2022-11-17 ENCOUNTER — HOSPITAL ENCOUNTER (EMERGENCY)
Age: 32
Discharge: HOME | End: 2022-11-17
Attending: SPECIALIST | Admitting: SPECIALIST
Payer: COMMERCIAL

## 2022-11-17 PROCEDURE — 96372 THER/PROPH/DIAG INJ SC/IM: CPT

## 2022-11-17 PROCEDURE — 74011250636 HC RX REV CODE- 250/636: Performed by: SPECIALIST

## 2022-11-17 RX ORDER — BETAMETHASONE SODIUM PHOSPHATE AND BETAMETHASONE ACETATE 3; 3 MG/ML; MG/ML
12 INJECTION, SUSPENSION INTRA-ARTICULAR; INTRALESIONAL; INTRAMUSCULAR; SOFT TISSUE ONCE
Status: COMPLETED | OUTPATIENT
Start: 2022-11-17 | End: 2022-11-17

## 2022-11-17 RX ORDER — BETAMETHASONE SODIUM PHOSPHATE AND BETAMETHASONE ACETATE 3; 3 MG/ML; MG/ML
6 INJECTION, SUSPENSION INTRA-ARTICULAR; INTRALESIONAL; INTRAMUSCULAR; SOFT TISSUE ONCE
Status: DISCONTINUED | OUTPATIENT
Start: 2022-11-17 | End: 2022-11-17

## 2022-11-17 RX ADMIN — BETAMETHASONE ACETATE AND BETAMETHASONE SODIUM PHOSPHATE 12 MG: 3; 3 INJECTION, SUSPENSION INTRA-ARTICULAR; INTRALESIONAL; INTRAMUSCULAR; SOFT TISSUE at 12:50

## 2022-11-17 NOTE — PROGRESS NOTES
1205:  Mariella Francis is a 29 y.o.   at 33w2d patient of Dr Yeni Lee at [facility] who presents to L&D triage for Betamethasone. She reports Positive FM, denies vaginal bleeding, LOF, and contractions. She also denies Headaches, Scotoma, RUQ pain, and Edema. Orders received from Dr. Yeni Lee to give Betamethasone only and d/c pt. Home. 1305: Discharge instructions reviewed. Pt. verbalized understanding. Pt. Denies any questions at this time. Pt. Ambulated off of unit with no s/sx of distress noted.

## 2022-11-18 ENCOUNTER — HOSPITAL ENCOUNTER (OUTPATIENT)
Age: 32
Setting detail: OBSERVATION
Discharge: HOME | End: 2022-11-20
Attending: SPECIALIST | Admitting: OBSTETRICS & GYNECOLOGY
Payer: COMMERCIAL

## 2022-11-18 PROBLEM — O09.213 HX OF PTL (PRETERM LABOR), CURRENT PREGNANCY, THIRD TRIMESTER: Status: ACTIVE | Noted: 2022-11-18

## 2022-11-18 LAB
ABO + RH BLD: NORMAL
ALBUMIN SERPL-MCNC: 2.9 G/DL (ref 3.5–5)
ALBUMIN/GLOB SERPL: 0.6 {RATIO} (ref 1.1–2.2)
ALP SERPL-CCNC: 126 U/L (ref 45–117)
ALT SERPL-CCNC: 15 U/L (ref 12–78)
ANION GAP SERPL CALC-SCNC: 11 MMOL/L (ref 5–15)
APPEARANCE UR: ABNORMAL
AST SERPL-CCNC: 17 U/L (ref 15–37)
BACTERIA URNS QL MICRO: ABNORMAL /HPF
BILIRUB SERPL-MCNC: 0.6 MG/DL (ref 0.2–1)
BILIRUB UR QL: NEGATIVE
BLOOD GROUP ANTIBODIES SERPL: NORMAL
BUN SERPL-MCNC: 4 MG/DL (ref 6–20)
BUN/CREAT SERPL: 6 (ref 12–20)
CALCIUM SERPL-MCNC: 9.2 MG/DL (ref 8.5–10.1)
CHLORIDE SERPL-SCNC: 108 MMOL/L (ref 97–108)
CO2 SERPL-SCNC: 19 MMOL/L (ref 21–32)
COLOR UR: ABNORMAL
CREAT SERPL-MCNC: 0.67 MG/DL (ref 0.55–1.02)
EPITH CASTS URNS QL MICRO: ABNORMAL /LPF
ERYTHROCYTE [DISTWIDTH] IN BLOOD BY AUTOMATED COUNT: 13.9 % (ref 11.5–14.5)
GLOBULIN SER CALC-MCNC: 4.6 G/DL (ref 2–4)
GLUCOSE SERPL-MCNC: 101 MG/DL (ref 65–100)
GLUCOSE UR STRIP.AUTO-MCNC: NEGATIVE MG/DL
HCT VFR BLD AUTO: 32.5 % (ref 35–47)
HGB BLD-MCNC: 11 G/DL (ref 11.5–16)
HGB UR QL STRIP: NEGATIVE
KETONES UR QL STRIP.AUTO: 40 MG/DL
LEUKOCYTE ESTERASE UR QL STRIP.AUTO: ABNORMAL
MCH RBC QN AUTO: 28.4 PG (ref 26–34)
MCHC RBC AUTO-ENTMCNC: 33.8 G/DL (ref 30–36.5)
MCV RBC AUTO: 83.8 FL (ref 80–99)
NITRITE UR QL STRIP.AUTO: NEGATIVE
NRBC # BLD: 0 K/UL (ref 0–0.01)
NRBC BLD-RTO: 0 PER 100 WBC
PH UR STRIP: 5.5 [PH] (ref 5–8)
PLATELET # BLD AUTO: 172 K/UL (ref 150–400)
PMV BLD AUTO: 11 FL (ref 8.9–12.9)
POTASSIUM SERPL-SCNC: 3.3 MMOL/L (ref 3.5–5.1)
PROT SERPL-MCNC: 7.5 G/DL (ref 6.4–8.2)
PROT UR STRIP-MCNC: ABNORMAL MG/DL
RBC # BLD AUTO: 3.88 M/UL (ref 3.8–5.2)
RBC #/AREA URNS HPF: ABNORMAL /HPF (ref 0–5)
SODIUM SERPL-SCNC: 138 MMOL/L (ref 136–145)
SP GR UR REFRACTOMETRY: 1.03
SPECIMEN EXP DATE BLD: NORMAL
UA: UC IF INDICATED,UAUC: ABNORMAL
UROBILINOGEN UR QL STRIP.AUTO: 1 EU/DL (ref 0.2–1)
WBC # BLD AUTO: 12.1 K/UL (ref 3.6–11)
WBC URNS QL MICRO: ABNORMAL /HPF (ref 0–4)

## 2022-11-18 PROCEDURE — G0378 HOSPITAL OBSERVATION PER HR: HCPCS

## 2022-11-18 PROCEDURE — 96374 THER/PROPH/DIAG INJ IV PUSH: CPT

## 2022-11-18 PROCEDURE — 36415 COLL VENOUS BLD VENIPUNCTURE: CPT

## 2022-11-18 PROCEDURE — 74011000258 HC RX REV CODE- 258: Performed by: OBSTETRICS & GYNECOLOGY

## 2022-11-18 PROCEDURE — 65410000002 HC RM PRIVATE OB

## 2022-11-18 PROCEDURE — 74011250636 HC RX REV CODE- 250/636: Performed by: OBSTETRICS & GYNECOLOGY

## 2022-11-18 PROCEDURE — 85027 COMPLETE CBC AUTOMATED: CPT

## 2022-11-18 PROCEDURE — 74011250637 HC RX REV CODE- 250/637: Performed by: OBSTETRICS & GYNECOLOGY

## 2022-11-18 PROCEDURE — 80053 COMPREHEN METABOLIC PANEL: CPT

## 2022-11-18 PROCEDURE — 87081 CULTURE SCREEN ONLY: CPT

## 2022-11-18 PROCEDURE — 96376 TX/PRO/DX INJ SAME DRUG ADON: CPT

## 2022-11-18 PROCEDURE — 86900 BLOOD TYPING SEROLOGIC ABO: CPT

## 2022-11-18 PROCEDURE — 96372 THER/PROPH/DIAG INJ SC/IM: CPT

## 2022-11-18 PROCEDURE — 81001 URINALYSIS AUTO W/SCOPE: CPT

## 2022-11-18 RX ORDER — SODIUM CHLORIDE 0.9 % (FLUSH) 0.9 %
5-40 SYRINGE (ML) INJECTION EVERY 8 HOURS
Status: DISCONTINUED | OUTPATIENT
Start: 2022-11-18 | End: 2022-11-20 | Stop reason: HOSPADM

## 2022-11-18 RX ORDER — ZOLPIDEM TARTRATE 5 MG/1
5 TABLET ORAL
Status: DISCONTINUED | OUTPATIENT
Start: 2022-11-18 | End: 2022-11-20 | Stop reason: HOSPADM

## 2022-11-18 RX ORDER — NIFEDIPINE 10 MG/1
20 CAPSULE ORAL 4 TIMES DAILY
Status: DISCONTINUED | OUTPATIENT
Start: 2022-11-18 | End: 2022-11-18

## 2022-11-18 RX ORDER — SODIUM CHLORIDE, SODIUM LACTATE, POTASSIUM CHLORIDE, CALCIUM CHLORIDE 600; 310; 30; 20 MG/100ML; MG/100ML; MG/100ML; MG/100ML
125 INJECTION, SOLUTION INTRAVENOUS CONTINUOUS
Status: DISCONTINUED | OUTPATIENT
Start: 2022-11-18 | End: 2022-11-19

## 2022-11-18 RX ORDER — BETAMETHASONE SODIUM PHOSPHATE AND BETAMETHASONE ACETATE 3; 3 MG/ML; MG/ML
12 INJECTION, SUSPENSION INTRA-ARTICULAR; INTRALESIONAL; INTRAMUSCULAR; SOFT TISSUE ONCE
Status: COMPLETED | OUTPATIENT
Start: 2022-11-18 | End: 2022-11-18

## 2022-11-18 RX ORDER — NIFEDIPINE 10 MG/1
10 CAPSULE ORAL 4 TIMES DAILY
Status: DISCONTINUED | OUTPATIENT
Start: 2022-11-18 | End: 2022-11-20 | Stop reason: HOSPADM

## 2022-11-18 RX ORDER — FAMOTIDINE 20 MG/1
20 TABLET, FILM COATED ORAL
Status: DISCONTINUED | OUTPATIENT
Start: 2022-11-18 | End: 2022-11-20 | Stop reason: HOSPADM

## 2022-11-18 RX ORDER — ACETAMINOPHEN 325 MG/1
650 TABLET ORAL
Status: DISCONTINUED | OUTPATIENT
Start: 2022-11-18 | End: 2022-11-20 | Stop reason: HOSPADM

## 2022-11-18 RX ORDER — FOLIC ACID/MULTIVIT,IRON,MINER 0.4MG-18MG
1 TABLET ORAL DAILY
Status: DISCONTINUED | OUTPATIENT
Start: 2022-11-19 | End: 2022-11-20 | Stop reason: HOSPADM

## 2022-11-18 RX ORDER — SODIUM CHLORIDE 0.9 % (FLUSH) 0.9 %
5-40 SYRINGE (ML) INJECTION AS NEEDED
Status: DISCONTINUED | OUTPATIENT
Start: 2022-11-18 | End: 2022-11-20 | Stop reason: HOSPADM

## 2022-11-18 RX ORDER — ONDANSETRON 2 MG/ML
4 INJECTION INTRAMUSCULAR; INTRAVENOUS
Status: DISCONTINUED | OUTPATIENT
Start: 2022-11-18 | End: 2022-11-20 | Stop reason: HOSPADM

## 2022-11-18 RX ORDER — DIPHENHYDRAMINE HYDROCHLORIDE 50 MG/ML
12.5 INJECTION, SOLUTION INTRAMUSCULAR; INTRAVENOUS
Status: DISCONTINUED | OUTPATIENT
Start: 2022-11-18 | End: 2022-11-20 | Stop reason: HOSPADM

## 2022-11-18 RX ADMIN — SODIUM CHLORIDE 2.5 MILLION UNITS: 9 INJECTION, SOLUTION INTRAVENOUS at 17:36

## 2022-11-18 RX ADMIN — NIFEDIPINE 10 MG: 10 CAPSULE ORAL at 13:35

## 2022-11-18 RX ADMIN — NIFEDIPINE 10 MG: 10 CAPSULE ORAL at 19:22

## 2022-11-18 RX ADMIN — SODIUM CHLORIDE 5 MILLION UNITS: 900 INJECTION INTRAVENOUS at 13:31

## 2022-11-18 RX ADMIN — BETAMETHASONE ACETATE AND BETAMETHASONE SODIUM PHOSPHATE 12 MG: 3; 3 INJECTION, SUSPENSION INTRA-ARTICULAR; INTRALESIONAL; INTRAMUSCULAR; SOFT TISSUE at 13:06

## 2022-11-18 RX ADMIN — NIFEDIPINE 10 MG: 10 CAPSULE ORAL at 23:02

## 2022-11-18 NOTE — PROGRESS NOTES
Pt comfortable without complaints at this time. Pt sitting up eating and visiting with family. Plan to continue to monitor until 1900, if no change will do intermittent monitoring, d/c antibiotics and watch until 24 hours post 2nd dose of betamethasone at 1300 tomorrow.
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
## 2022-11-18 NOTE — CONSULTS
[facility]  Prenatal Consult  Ravi Wilkinson MRN: [id number] [facility]: [id number]  Note Date: 2022  Place of Service:  InPatientRequested By: Funmilayo Camejo  Reason for Consultation: 33w3d, 4 cm dilated with history of multiple  births    Maternal History  : 1990Mother's Age: 30Blood Type: B PosMother's Race: BlackP:  1313  RPR Serology: Non-ReactiveHIV: NegativeRubella:  ImmuneGBS: UnknownHBsAg: Negative  Prenatal Care: YesEDC OB: 2023  Family History:  Mom reports history of late onset GBS disease in older child  Pregnancy Complications  Premature onset of labor, w/o delivery, 3rd trimester (O60.03)  Maternal Steroids Yes  Last Dose Date: 2022 at 12:00:00Next Recent Dose Date: 2022 at 13:00:00  Maternal Medications: Yes  PenicillinComment: 2022  Pregnancy Comment  Mom with history of multiple pre-term births (32w3d,23w0d,35w3d, her 21 week baby did not survive). She presented on  for BMZ. In office today was dm and 4 cm so sent to L&D. Exam remains unchanged at this time. Present Plan  Admit to L&D, attempt for 24 hours of Tocolysis. 2nd dose of BMZ given. PCN given as GBS unknown, history of late onset GBS dx. Discussion/Counseling  I spoke to Ms. White and her mother in her antepartum room. She is having a baby boy who will be named \"Jovani\". Discussed may be ok in room air but may also require oxygen/CPAP at this gestational age. Some babies also require surfactant. She is familiar with this as her 28 weeker required it. She reports he only required feeding tube very briefly (1-2 days). Discussed every baby is different and to not necessary expect the same for this infant. She does plan to pump EBM (doesn't want to breastfeed directly but pump). She declines donor milk as bridge- prefers formula.    She asked how long he would be required to stay- discussed will need to be in room air, holding temperatures, eating completely by mouth. Also needs to be at least 4 lbs to be able to fit in a car seat. Also discussed that given  labor, we will evaluate infant for sepsis as this can be a cause (less likely given her history but can't rule it out). This would mean blood culture and 36 hours of antibiotics. Recommendations  Please notify NICU once delivery is imminent     The total length of floor unit time was 20 minute(s). Counseling and/or coordination of care dominated more than fifty percent of the time.   Authenticated by: Breanna Yuen MD   Date/Time: 2022 16:16

## 2022-11-19 PROCEDURE — 65410000002 HC RM PRIVATE OB

## 2022-11-19 PROCEDURE — G0378 HOSPITAL OBSERVATION PER HR: HCPCS

## 2022-11-19 PROCEDURE — 74011250637 HC RX REV CODE- 250/637: Performed by: OBSTETRICS & GYNECOLOGY

## 2022-11-19 PROCEDURE — 74011000250 HC RX REV CODE- 250: Performed by: OBSTETRICS & GYNECOLOGY

## 2022-11-19 RX ADMIN — NIFEDIPINE 10 MG: 10 CAPSULE ORAL at 23:22

## 2022-11-19 RX ADMIN — SODIUM CHLORIDE, PRESERVATIVE FREE 10 ML: 5 INJECTION INTRAVENOUS at 15:15

## 2022-11-19 RX ADMIN — NIFEDIPINE 10 MG: 10 CAPSULE ORAL at 19:24

## 2022-11-19 RX ADMIN — NIFEDIPINE 10 MG: 10 CAPSULE ORAL at 13:06

## 2022-11-19 RX ADMIN — SODIUM CHLORIDE, PRESERVATIVE FREE 10 ML: 5 INJECTION INTRAVENOUS at 09:00

## 2022-11-19 RX ADMIN — NIFEDIPINE 10 MG: 10 CAPSULE ORAL at 08:35

## 2022-11-19 RX ADMIN — Medication 1 TABLET: at 08:35

## 2022-11-19 NOTE — PROGRESS NOTES
[facility] Obstetrics Progress Note    Name: Juan Shelby MRN: [id number]  SSN: [id number]    YOB: 1990  Age: 29 y.o. Sex: female      Subjective:      LOS: 1 day    Estimated Date of Delivery: 1/3/23   Gestational Age Today: 33w4d     Patient admitted for  labor. States she does have mild contractions. Objective:     Vitals:  Blood pressure 131/72, pulse 87, temperature 98 °F (36.7 °C), resp. rate 16, height 5' 3\" (1.6 m), weight 81.2 kg (179 lb), SpO2 100 %, currently breastfeeding. Temp (24hrs), Av.9 °F (36.6 °C), Min:97.6 °F (36.4 °C), Max:98.1 °F (84.6 °C)    Systolic (63LKG), EHH:309 , Min:107 , UKL:608      Diastolic (79KTT), BRENDA:33, Min:67, Max:72       Intake and Output:         Physical Exam:  Cervical Exam: 5 cm dilated    50% effaced    -2 station    Presenting Part: cephalic  Cervical Position: mid position  Consistency: Medium       Membranes:  Intact    Uterine Activity:  irregular    Fetal Heart Rate:  Reactive  Baseline: 130 per minute        Labs:   Recent Results (from the past 36 hour(s))   METABOLIC PANEL, COMPREHENSIVE    Collection Time: 22  1:38 PM   Result Value Ref Range    Sodium 138 136 - 145 mmol/L    Potassium 3.3 (L) 3.5 - 5.1 mmol/L    Chloride 108 97 - 108 mmol/L    CO2 19 (L) 21 - 32 mmol/L    Anion gap 11 5 - 15 mmol/L    Glucose 101 (H) 65 - 100 mg/dL    BUN 4 (L) 6 - 20 MG/DL    Creatinine 0.67 0.55 - 1.02 MG/DL    BUN/Creatinine ratio 6 (L) 12 - 20      eGFR >60 >60 ml/min/1.73m2    Calcium 9.2 8.5 - 10.1 MG/DL    Bilirubin, total 0.6 0.2 - 1.0 MG/DL    ALT (SGPT) 15 12 - 78 U/L    AST (SGOT) 17 15 - 37 U/L    Alk.  phosphatase 126 (H) 45 - 117 U/L    Protein, total 7.5 6.4 - 8.2 g/dL    Albumin 2.9 (L) 3.5 - 5.0 g/dL    Globulin 4.6 (H) 2.0 - 4.0 g/dL    A-G Ratio 0.6 (L) 1.1 - 2.2     CBC W/O DIFF    Collection Time: 22  1:38 PM   Result Value Ref Range    WBC 12.1 (H) 3.6 - 11.0 K/uL    RBC 3.88 3.80 - 5.20 M/uL    HGB 11.0 (L) 11.5 - 16.0 g/dL    HCT 32.5 (L) 35.0 - 47.0 %    MCV 83.8 80.0 - 99.0 FL    MCH 28.4 26.0 - 34.0 PG    MCHC 33.8 30.0 - 36.5 g/dL    RDW 13.9 11.5 - 14.5 %    PLATELET 295 178 - 441 K/uL    MPV 11.0 8.9 - 12.9 FL    NRBC 0.0 0  WBC    ABSOLUTE NRBC 0.00 0.00 - 0.01 K/uL   URINALYSIS W/ REFLEX CULTURE    Collection Time: 22  1:38 PM    Specimen: Urine   Result Value Ref Range    Color YELLOW/STRAW      Appearance CLOUDY (A) CLEAR      Specific gravity 1.026      pH (UA) 5.5 5.0 - 8.0      Protein TRACE (A) NEG mg/dL    Glucose Negative NEG mg/dL    Ketone 40 (A) NEG mg/dL    Bilirubin Negative NEG      Blood Negative NEG      Urobilinogen 1.0 0.2 - 1.0 EU/dL    Nitrites Negative NEG      Leukocyte Esterase MODERATE (A) NEG      WBC 5-10 0 - 4 /hpf    RBC 0-5 0 - 5 /hpf    Epithelial cells MANY (A) FEW /lpf    Bacteria 1+ (A) NEG /hpf    UA:UC IF INDICATED CULTURE NOT INDICATED BY UA RESULT CNI     TYPE & SCREEN    Collection Time: 22  1:38 PM   Result Value Ref Range    Crossmatch Expiration 2022,2359     ABO/Rh(D) B POSITIVE     Antibody screen NEG        Assessment and Plan:       Active Problems:    Hx of PTL ( labor), current pregnancy, third trimester (2022)        Labor:  had 2 doses of betamethasone, continue observation -hospitalization, continue procardia , bedrest with bathroom privileges

## 2022-11-19 NOTE — PROGRESS NOTES
0715: Oncoming SBAR report received from ANDREW Wasserman RN. Pt. Care assumed at this time. 1035: Dr. Mari Rutherford at bedside. SVE performed, 5/50/-2. POC discussed, plan to continue to monitor pt. Plan to allow pt. Modified bedrest. Plan to allow pt. To rest and recheck cervix tomorrow. If active labor begins plan to start PCN and IVF. 1915: Offgoing SBAR [facility]. Pt. Care turned over at this time.

## 2022-11-20 VITALS
DIASTOLIC BLOOD PRESSURE: 85 MMHG | TEMPERATURE: 98 F | OXYGEN SATURATION: 98 % | RESPIRATION RATE: 16 BRPM | HEIGHT: 63 IN | WEIGHT: 179 LBS | HEART RATE: 59 BPM | BODY MASS INDEX: 31.71 KG/M2 | SYSTOLIC BLOOD PRESSURE: 127 MMHG

## 2022-11-20 PROCEDURE — 74011000250 HC RX REV CODE- 250: Performed by: OBSTETRICS & GYNECOLOGY

## 2022-11-20 PROCEDURE — 74011250637 HC RX REV CODE- 250/637: Performed by: OBSTETRICS & GYNECOLOGY

## 2022-11-20 PROCEDURE — G0378 HOSPITAL OBSERVATION PER HR: HCPCS

## 2022-11-20 PROCEDURE — 59025 FETAL NON-STRESS TEST: CPT

## 2022-11-20 RX ORDER — NIFEDIPINE 10 MG/1
10 CAPSULE ORAL 4 TIMES DAILY
Qty: 40 CAPSULE | Refills: 1 | Status: SHIPPED | OUTPATIENT
Start: 2022-11-20

## 2022-11-20 RX ADMIN — SODIUM CHLORIDE, PRESERVATIVE FREE 10 ML: 5 INJECTION INTRAVENOUS at 06:15

## 2022-11-20 RX ADMIN — NIFEDIPINE 10 MG: 10 CAPSULE ORAL at 08:17

## 2022-11-20 RX ADMIN — Medication 1 TABLET: at 08:17

## 2022-11-20 RX ADMIN — SODIUM CHLORIDE, PRESERVATIVE FREE 10 ML: 5 INJECTION INTRAVENOUS at 08:19

## 2022-11-20 NOTE — DISCHARGE INSTRUCTIONS
Labor: Care Instructions  Overview      labor is the start of labor between 21 and 36 weeks of pregnancy. Most babies are born at 40 to 41 weeks of pregnancy. In labor, the uterus contracts to open the cervix. This is the first stage of childbirth.  labor can be caused by a problem with the baby, the mother, or both. Often the cause is not known. In some cases, doctors use medicines to try to delay labor until 29 or more weeks of pregnancy. By this time, a baby has grown enough so that problems are not likely. In some cases--such as with a serious infection--it is healthier for the baby to be born early. Your treatment will depend on how far along you are in your pregnancy and on your health and your baby's health. Follow-up care is a key part of your treatment and safety. Be sure to make and go to all appointments, and call your doctor if you are having problems. It's also a good idea to know your test results and keep a list of the medicines you take. How can you care for yourself at home? If your doctor prescribed medicines, take them exactly as directed. Call your doctor if you think you are having a problem with your medicine. Rest until your doctor advises you about activity. Do not have sexual intercourse unless your doctor says it is safe. Use sanitary pads if you have vaginal bleeding. Using pads makes it easier to monitor your bleeding. Do not smoke or allow others to smoke around you. If you need help quitting, talk to your doctor about stop-smoking programs and medicines. These can increase your chances of quitting for good. When should you call for help? Call 911  anytime you think you may need emergency care. For example, call if:    You passed out (lost consciousness). You have a seizure. You have severe vaginal bleeding. You have severe pain in your belly or pelvis that doesn't get better between contractions.      You have had fluid gushing or leaking from your vagina and you know or think the umbilical cord is bulging into your vagina. If this happens, immediately get down on your knees so your rear end (buttocks) is higher than your head. This will decrease the pressure on the cord until help arrives. Call your doctor now or seek immediate medical care if:    You have signs of preeclampsia, such as:  Sudden swelling of your face, hands, or feet. New vision problems (such as dimness, blurring, or seeing spots). A severe headache. You have any vaginal bleeding. You have belly pain or cramping. You have a fever. You have had regular contractions (with or without pain) for an hour. This means that you have 6 or more within 1 hour after you change your position and drink fluids. You have a sudden release of fluid from the vagina. You have low back pain or pelvic pressure that does not go away. You notice that your baby has stopped moving or is moving much less than normal.   Watch closely for changes in your health, and be sure to contact your doctor if you have any problems. Where can you learn more? Go to http://www.gray.com/  Enter Q400 in the search box to learn more about \" Labor: Care Instructions. \"  Current as of: 2022               Content Version: 13.4   [employer]. Care instructions adapted under license by [employer] (which disclaims liability or warranty for this information). If you have questions about a medical condition or this instruction, always ask your healthcare professional. [place] [address] any warranty or liability for your use of this information. Weeks 32 to 34 of Your Pregnancy: Care Instructions  Decide whether you want to bank or donate your baby's umbilical cord blood. If you want to save this blood, you have to arrange for it ahead of time. Decide about circumcision.  Personal, Buddhist, or cultural beliefs may play a role in your decision. You get to decide what you want for your baby. Learn how to ease hemorrhoids. Get more liquids, fruits, vegetables, and fiber in your diet. Avoid sitting for too long. Clean yourself with moist toilet paper. Or try witch hazel pads. Try ice packs or warm sitz baths for discomfort. Use hydrocortisone cream for pain or itching. Ask your doctor about stool softeners. Consider the benefits of breastfeeding. It reduces your baby's risk of sudden infant death syndrome (SIDS).  babies are less likely to get certain infections. And they're less likely to be obese or get diabetes later in life. It can lower your risk of breast and ovarian cancers and osteoporosis. It saves you money. Follow-up care is a key part of your treatment and safety. Be sure to make and go to all appointments, and call your doctor if you are having problems. It's also a good idea to know your test results and keep a list of the medicines you take. Where can you learn more? Go to http://www.montes.com/  Enter X711 in the search box to learn more about \"Weeks 32 to 34 of Your Pregnancy: Care Instructions. \"  Current as of: February 23, 2022               Content Version: 13.4  © 2006-2022 [employer]. Care instructions adapted under license by [employer] (which disclaims liability or warranty for this information). If you have questions about a medical condition or this instruction, always ask your healthcare professional. [place] [address] any warranty or liability for your use of this information.

## 2022-11-20 NOTE — PROGRESS NOTES
Dr. Alma Cruz at bedside to perform cervical check. No change. Orders received to discharge patient home and follow-up with Dr. Yeni Lee on Tuesday. 1215 - Discharge instructions reviewed and signed with patient. Prescriptions given.

## 2022-11-20 NOTE — PROGRESS NOTES
1910. Bedside shift change report given to ANDREW Park RN (oncoming nurse) by Sylvester Barfield RN (offgoing nurse). Report included the following information SBAR, Kardex, Procedure Summary, Intake/Output, MAR, Accordion, and Recent Results. 1933. NST started.    2001. NST reactive and complete.    0720. Bedside shift change report given to PROSPER Barfield RN (oncoming nurse) by ANDREW Park RN (offgoing nurse). Report included the following information SBAR, Kardex, Procedure Summary, Intake/Output, MAR, and Recent Results.

## 2022-11-20 NOTE — PROGRESS NOTES
[facility] Progress Note    Name: Mariella Francis MRN: [id number]  SSN: [id number]    YOB: 1990  Age: 29 y.o. Sex: female      Subjective:      LOS: 2 days    Estimated Date of Delivery: 1/3/23   Gestational Age Today: 33w5d     Patient admitted for  labor. States she does not have abdominal pain   and contractions. Objective:     Vitals:  Blood pressure 127/85, pulse (!) 59, temperature 98 °F (36.7 °C), resp. rate 16, height 5' 3\" (1.6 m), weight 81.2 kg (179 lb), SpO2 98 %, currently breastfeeding. Temp (24hrs), Av.9 °F (36.6 °C), Min:97.8 °F (36.6 °C), Max:98 °F (90.2 °C)    Systolic (24XSO), PCJ:583 , Min:105 , DKN:490      Diastolic (88DXL), SXP:15, Min:65, Max:85       Intake and Output:         Physical Exam:  Cervical Exam: 4 cm dilated    50% effaced    -2 station    Presenting Part: cephalic  Cervical Position: mid position  Consistency: Medium       Membranes:  Intact    Uterine Activity:  None    Fetal Heart Rate:  Reactive  Baseline: 130 per minute        Labs: No results found for this or any previous visit (from the past 36 hour(s)). Assessment and Plan:       Active Problems:    Hx of PTL ( labor), current pregnancy, third trimester (2022)       PRE TERM LABOR WANTS TO GO HOME SINCE HAS 3 CHILDREN AT HOME , HAD BETAMETHASONE, NO CERVICAL CHANGES AND NO ABDOMINAL PAIN OR CONTRACTIONS, DISCUSSED POSSIBILITY OF RAPID LABOR , FOLLOW  UP 48 HOURS

## 2022-11-21 LAB
BACTERIA SPEC CULT: NORMAL
SERVICE CMNT-IMP: NORMAL